# Patient Record
Sex: FEMALE | Race: BLACK OR AFRICAN AMERICAN | URBAN - METROPOLITAN AREA
[De-identification: names, ages, dates, MRNs, and addresses within clinical notes are randomized per-mention and may not be internally consistent; named-entity substitution may affect disease eponyms.]

---

## 2020-12-02 ENCOUNTER — OFFICE VISIT (OUTPATIENT)
Dept: URBAN - METROPOLITAN AREA CLINIC 25 | Facility: CLINIC | Age: 45
End: 2020-12-02
Payer: COMMERCIAL

## 2020-12-02 DIAGNOSIS — K76.89 LIVER CYST: ICD-10-CM

## 2020-12-02 DIAGNOSIS — Z12.11 SCREENING COLONOSCOPY: ICD-10-CM

## 2020-12-02 PROCEDURE — G9903 PT SCRN TBCO ID AS NON USER: HCPCS | Performed by: INTERNAL MEDICINE

## 2020-12-02 PROCEDURE — 99204 OFFICE O/P NEW MOD 45 MIN: CPT | Performed by: INTERNAL MEDICINE

## 2020-12-02 PROCEDURE — G8483 FLU IMM NO ADMIN DOC REA: HCPCS | Performed by: INTERNAL MEDICINE

## 2020-12-02 PROCEDURE — G8417 CALC BMI ABV UP PARAM F/U: HCPCS | Performed by: INTERNAL MEDICINE

## 2020-12-02 PROCEDURE — G8427 DOCREV CUR MEDS BY ELIG CLIN: HCPCS | Performed by: INTERNAL MEDICINE

## 2020-12-02 PROCEDURE — 1036F TOBACCO NON-USER: CPT | Performed by: INTERNAL MEDICINE

## 2020-12-02 PROCEDURE — 99244 OFF/OP CNSLTJ NEW/EST MOD 40: CPT | Performed by: INTERNAL MEDICINE

## 2020-12-02 RX ORDER — SODIUM, POTASSIUM,MAG SULFATES 17.5-3.13G
354 ML SOLUTION, RECONSTITUTED, ORAL ORAL
Qty: 354 ML | Refills: 0 | OUTPATIENT
Start: 2020-12-02 | End: 2020-12-03

## 2020-12-02 NOTE — HPI-TODAY'S VISIT:
The patient was referred by Dr. Ríos for liver cysts .   A copy of this document is being forwarded to the referring provider.  She went to the ER in 10/2020 for right sided abdominal pain.  This was at [facility].  According to the patient she had a CT Scan that showed fibroids and liver cysts.  According to the patient her labs were normal. Overall she has been feeling well.  She still does have occasional tenderness on the right side if she touches it or leans on that side.  She denies anorexia or weight loss.  She denies UGI symptoms.  She denies dysphagia.  She does have an occasional globus sensation with some burning.  She has early satiety.  She has constipation.  She has a daily BM.  She has strain and incomplete evacuation.  She denies LGI bleed or melena.  There is no FHx of colon cancer. There is no FHx of liver disease.  Her mother has ESRD.  She has never had a colonoscopy.  Her Ob/GYN is Dr. Peggy Ramsey.  She has not been on recent birth control

## 2020-12-02 NOTE — PHYSICAL EXAM GASTROINTESTINAL
Abdomen , soft, nontender, nondistended , no guarding or rigidity , no masses palpable , normal bowel sounds , Liver and Spleen , no hepatomegaly present , no hepatosplenomegaly , liver nontender , spleen not palpable , right upper quadrant tenderness

## 2020-12-09 ENCOUNTER — LAB OUTSIDE AN ENCOUNTER (OUTPATIENT)
Dept: URBAN - METROPOLITAN AREA CLINIC 84 | Facility: CLINIC | Age: 45
End: 2020-12-09

## 2020-12-14 ENCOUNTER — CLAIMS CREATED FROM THE CLAIM WINDOW (OUTPATIENT)
Dept: URBAN - METROPOLITAN AREA CLINIC 4 | Facility: CLINIC | Age: 45
End: 2020-12-14
Payer: COMMERCIAL

## 2020-12-14 ENCOUNTER — TELEPHONE ENCOUNTER (OUTPATIENT)
Dept: URBAN - METROPOLITAN AREA CLINIC 92 | Facility: CLINIC | Age: 45
End: 2020-12-14

## 2020-12-14 ENCOUNTER — OFFICE VISIT (OUTPATIENT)
Dept: URBAN - METROPOLITAN AREA SURGERY CENTER 20 | Facility: SURGERY CENTER | Age: 45
End: 2020-12-14
Payer: COMMERCIAL

## 2020-12-14 DIAGNOSIS — D12.3 ADENOMA OF TRANSVERSE COLON: ICD-10-CM

## 2020-12-14 DIAGNOSIS — Z12.11 COLON CANCER SCREENING: ICD-10-CM

## 2020-12-14 DIAGNOSIS — D12.3 BENIGN NEOPLASM OF TRANSVERSE COLON: ICD-10-CM

## 2020-12-14 PROBLEM — 300331000 LESION OF LIVER: Status: ACTIVE | Noted: 2020-12-14

## 2020-12-14 PROCEDURE — 88305 TISSUE EXAM BY PATHOLOGIST: CPT | Performed by: PATHOLOGY

## 2020-12-14 PROCEDURE — 45380 COLONOSCOPY AND BIOPSY: CPT | Performed by: INTERNAL MEDICINE

## 2020-12-14 PROCEDURE — G8907 PT DOC NO EVENTS ON DISCHARG: HCPCS | Performed by: INTERNAL MEDICINE

## 2020-12-14 PROCEDURE — G9933 CANC DETECTD DURING COL SCRN: HCPCS | Performed by: INTERNAL MEDICINE

## 2021-01-07 ENCOUNTER — LAB OUTSIDE AN ENCOUNTER (OUTPATIENT)
Dept: URBAN - METROPOLITAN AREA CLINIC 84 | Facility: CLINIC | Age: 46
End: 2021-01-07

## 2021-01-13 LAB
AFP, SERUM: 2.8
AFP-L3%, SERUM: (no result)
ALBUMIN: 4.4
ALKALINE PHOSPHATASE: 112
ALPHA 2-MACROGLOBULINS, QN: 131
ALT (SGPT) P5P: 14
ALT (SGPT): 11
APOLIPOPROTEIN A-1: 144
AST (SGOT): 16
BILIRUBIN, DIRECT: 0.12
BILIRUBIN, TOTAL: 0.3
BILIRUBIN, TOTAL: 0.3
CA 19-9: 12
CEA: 0.7
COMMENT:: (no result)
FIBROSIS SCORE: 0.07
FIBROSIS SCORING:: (no result)
FIBROSIS STAGE: (no result)
GGT: 38
HAPTOGLOBIN: 91
INTERPRETATIONS:: (no result)
LIMITATIONS:: (no result)
NECROINFLAMM ACTIVITY SCORING:: (no result)
NECROINFLAMMAT ACTIVITY GRADE: (no result)
NECROINFLAMMAT ACTIVITY SCORE: 0.03
PROTEIN, TOTAL: 7.5

## 2021-02-03 ENCOUNTER — OFFICE VISIT (OUTPATIENT)
Dept: URBAN - METROPOLITAN AREA CLINIC 25 | Facility: CLINIC | Age: 46
End: 2021-02-03

## 2021-02-24 ENCOUNTER — OFFICE VISIT (OUTPATIENT)
Dept: URBAN - METROPOLITAN AREA CLINIC 25 | Facility: CLINIC | Age: 46
End: 2021-02-24

## 2021-03-17 ENCOUNTER — OFFICE VISIT (OUTPATIENT)
Dept: URBAN - METROPOLITAN AREA CLINIC 25 | Facility: CLINIC | Age: 46
End: 2021-03-17
Payer: COMMERCIAL

## 2021-03-17 ENCOUNTER — TELEPHONE ENCOUNTER (OUTPATIENT)
Dept: URBAN - METROPOLITAN AREA CLINIC 6 | Facility: CLINIC | Age: 46
End: 2021-03-17

## 2021-03-17 VITALS
WEIGHT: 269.2 LBS | HEIGHT: 64 IN | HEART RATE: 96 BPM | TEMPERATURE: 96.8 F | DIASTOLIC BLOOD PRESSURE: 117 MMHG | BODY MASS INDEX: 45.96 KG/M2 | SYSTOLIC BLOOD PRESSURE: 170 MMHG

## 2021-03-17 DIAGNOSIS — K76.89 LIVER CYST: ICD-10-CM

## 2021-03-17 PROCEDURE — 99213 OFFICE O/P EST LOW 20 MIN: CPT | Performed by: INTERNAL MEDICINE

## 2021-03-17 NOTE — HPI-TODAY'S VISIT:
Colonoscopy had small TA with a 5 year recall.  Triple Phase CT Scan had multiple hepatic cysts which appeared simple and benign.  LFT's were normal.  Fibrosure was normal.  Tumor markers were negative. She is going to be speaking to Dr. Ríos about the gastric sleeve.  Overall she feels well.  She denies anorexia or weight loss.  She denies abdominal pain.  She denies UGI symptoms. She denies LGI symptolms.  She denies LGI bleed or melena.

## 2021-09-22 ENCOUNTER — LAB OUTSIDE AN ENCOUNTER (OUTPATIENT)
Dept: URBAN - METROPOLITAN AREA CLINIC 84 | Facility: CLINIC | Age: 46
End: 2021-09-22

## 2021-10-22 ENCOUNTER — TELEPHONE ENCOUNTER (OUTPATIENT)
Dept: URBAN - METROPOLITAN AREA CLINIC 25 | Facility: CLINIC | Age: 46
End: 2021-10-22

## 2021-11-10 ENCOUNTER — OFFICE VISIT (OUTPATIENT)
Dept: URBAN - METROPOLITAN AREA CLINIC 25 | Facility: CLINIC | Age: 46
End: 2021-11-10

## 2021-12-08 ENCOUNTER — DASHBOARD ENCOUNTERS (OUTPATIENT)
Age: 46
End: 2021-12-08

## 2021-12-08 ENCOUNTER — WEB ENCOUNTER (OUTPATIENT)
Dept: URBAN - METROPOLITAN AREA CLINIC 25 | Facility: CLINIC | Age: 46
End: 2021-12-08

## 2021-12-08 ENCOUNTER — OFFICE VISIT (OUTPATIENT)
Dept: URBAN - METROPOLITAN AREA CLINIC 25 | Facility: CLINIC | Age: 46
End: 2021-12-08
Payer: COMMERCIAL

## 2021-12-08 VITALS
WEIGHT: 276 LBS | TEMPERATURE: 97.5 F | SYSTOLIC BLOOD PRESSURE: 112 MMHG | BODY MASS INDEX: 47.12 KG/M2 | HEIGHT: 64 IN | DIASTOLIC BLOOD PRESSURE: 80 MMHG

## 2021-12-08 DIAGNOSIS — E66.9 OBESITY (BMI 35.0-39.9 WITHOUT COMORBIDITY): ICD-10-CM

## 2021-12-08 DIAGNOSIS — K76.89 LIVER CYST: ICD-10-CM

## 2021-12-08 PROCEDURE — 99213 OFFICE O/P EST LOW 20 MIN: CPT | Performed by: INTERNAL MEDICINE

## 2021-12-08 NOTE — HPI-TODAY'S VISIT:
Patient last seen 3/2021.  Triple Phase CT Scan from 9/2021 showed stable hepati cysts and uterine fibroids.  Overall she has been feeling fine.  She did get an evaluation for a gastric sleeve with Dr. Burnett.  She denies arneoxia or weight loss.  SHe denies abdomainl pain.  SHe denies UGI symptoms.  She denies LGI symptoms.  She denies LGI bleed or melena

## 2021-12-13 PROBLEM — 162864005: Status: ACTIVE | Noted: 2021-12-08

## 2021-12-13 PROBLEM — 85057007 LIVER CYST: Status: ACTIVE | Noted: 2020-12-02
